# Patient Record
Sex: FEMALE | Race: WHITE | NOT HISPANIC OR LATINO | URBAN - METROPOLITAN AREA
[De-identification: names, ages, dates, MRNs, and addresses within clinical notes are randomized per-mention and may not be internally consistent; named-entity substitution may affect disease eponyms.]

---

## 2017-07-31 ENCOUNTER — APPOINTMENT (RX ONLY)
Dept: URBAN - METROPOLITAN AREA CLINIC 12 | Facility: CLINIC | Age: 67
Setting detail: DERMATOLOGY
End: 2017-07-31

## 2017-07-31 DIAGNOSIS — Z80.8 FAMILY HISTORY OF MALIGNANT NEOPLASM OF OTHER ORGANS OR SYSTEMS: ICD-10-CM

## 2017-07-31 DIAGNOSIS — L20.89 OTHER ATOPIC DERMATITIS: ICD-10-CM

## 2017-07-31 PROBLEM — L20.84 INTRINSIC (ALLERGIC) ECZEMA: Status: ACTIVE | Noted: 2017-07-31

## 2017-07-31 PROCEDURE — 99202 OFFICE O/P NEW SF 15 MIN: CPT

## 2017-07-31 PROCEDURE — ? IN-HOUSE DISPENSING PHARMACY

## 2017-07-31 PROCEDURE — ? TREATMENT REGIMEN

## 2017-07-31 PROCEDURE — ? COUNSELING

## 2017-07-31 ASSESSMENT — LOCATION SIMPLE DESCRIPTION DERM
LOCATION SIMPLE: RIGHT FOREARM
LOCATION SIMPLE: RIGHT EYEBROW
LOCATION SIMPLE: LEFT EYEBROW
LOCATION SIMPLE: RIGHT CHEEK
LOCATION SIMPLE: LEFT CHEEK
LOCATION SIMPLE: LEFT FOREARM

## 2017-07-31 ASSESSMENT — LOCATION ZONE DERM
LOCATION ZONE: ARM
LOCATION ZONE: FACE

## 2017-07-31 ASSESSMENT — LOCATION DETAILED DESCRIPTION DERM
LOCATION DETAILED: RIGHT PROXIMAL DORSAL FOREARM
LOCATION DETAILED: RIGHT SUPERIOR LATERAL BUCCAL CHEEK
LOCATION DETAILED: LEFT PROXIMAL DORSAL FOREARM
LOCATION DETAILED: LEFT SUPERIOR CENTRAL BUCCAL CHEEK
LOCATION DETAILED: LEFT CENTRAL EYEBROW
LOCATION DETAILED: RIGHT CENTRAL EYEBROW

## 2017-07-31 NOTE — PROCEDURE: TREATMENT REGIMEN
Plan: Use Clobetasol ointment bid on the arms for 2-3 weeks then stop using for 10-14 days before reusing \\nUse cloderm bid on the face for 2 weeks then stop using for 10 days before reusing \\nRecommended using Cerave cream daily as often as possible
Discontinue Regimen: Aveeno itch relief cream\\nVanicream
Samples Given: Cloderm
Detail Level: Simple

## 2017-07-31 NOTE — PROCEDURE: MIPS QUALITY
Quality 110: Preventive Care And Screening: Influenza Immunization: Influenza Immunization not Administered because Patient Refused.
Quality 111:Pneumonia Vaccination Status For Older Adults: Pneumococcal Vaccination Previously Received
Quality 130: Documentation Of Current Medications In The Medical Record: Current Medications Documented
Detail Level: Detailed
Quality 226: Preventive Care And Screening: Tobacco Use: Screening And Cessation Intervention: Patient screened for tobacco and is a smoker AND received Cessation Counseling

## 2017-07-31 NOTE — PROCEDURE: IN-HOUSE DISPENSING PHARMACY
Product 56 Price/Unit (In Dollars): 0
Product 77 Unit Type: mg
Name Of Product 9: Anti-Fungal Nail Solution 858299 - \\nCiclopirox 8% - Fluconazole 1% - Terbinafine 1%
Render Product Pricing In Note: Yes
Product 1 Amount/Unit (Numbers Only): 30
Name Of Product 18: Wart Gel - [id number] \\nCimetidine 5% - Ibuprofen 2% - Salicylic Acid 17%
Name Of Product 6: Acne Gel Combo -513411\\nBenzoyl Peroxide 5% - Clidamycin 1% - Niacinamide 4%
Product 9 Unit Type: ml
Product 15 Application Directions: Use bid for 2 weeks then stop using for 10-14 days before reusing
Product 11 Application Directions: Apply topically to  affected area's QD-BID
Product 18 Price/Unit (In Dollars): 35.00
Product 13 Amount/Unit (Numbers Only): 60
Product 2 Refills: 5
Product 4 Refills: 2
Detail Level: Zone
Product 32 Amount/Unit (Numbers Only): 15
Product 2 Amount/Unit (Numbers Only): 30
Product 2 Price/Unit (In Dollars): 40.00
Name Of Product 14: Clob Cream - 860199 \\nClobetasol Propionate 0.05% - Niacinamide 4%
Product 3 Amount/Unit (Numbers Only): 30
Product 6 Application Directions: Apply topically to affected area's QD - BID
Product 5 Price/Unit (In Dollars): 45.00
Name Of Product 15: Triam Dermatitis Cream - 290213 \\nNiacinamide 4% - Triamcinolone Acetonide 0.1%
Product 16 Amount/Unit (Numbers Only): 60
Name Of Product 8: Actinic Keratosis Gel - 570786\\nImiquimoid 5% - Levocetirizine Dihydrochloride 1% - \\nNiacinamide 2%
Product 18 Application Directions: Apply to warts then occlusion at bedtime
Name Of Product 1: Tret 0.025% Cream - 329398 - \\nNiacinamide 4% -Tretinoin 0.025%
Name Of Product 7: Acne Gel - 889294\\nAdapalene 0.3% - Benzoyl Peroxide 2.5% - Niacinamide 4%
Product 10 Price/Unit (In Dollars): 20.00
Name Of Product 17: Rosacea Cream -339643\\nIvermectin 1% - Metronidazole 1% - Niacinamide 4% - Potassium Azeloyl Diglycinate 5%
Product 17 Application Directions: Apply topically to face QD to BID
Product 8 Application Directions: Apply topically to affected area's as directed by physician
Name Of Product 3: Tret 0.1% Cream - 825998 \\n Niacinamide 4% -Tretinoin 0.1%
Product 2 Application Directions: Apply 1 pump (pea-sized amount) to entire face at bed time
Product 13 Unit Type: grams
Name Of Product 5: Acne Gel With Dapsone -878697\\nDapsone 8.5% - Niacinamide 2% - Spironolactone 5%
Product 14 Application Directions: Apply bid to affected areas for up to 3 weeks then stop using 10-14 days before reusing. \\nDo not use on face, armpit, or groin areas.
Name Of Product 2: Tret 0.05% Cream - 595466 \\n Niacinamide 4% -Tretinoin 0.05%
Name Of Product 4: Benji 0.1% Acne Gel - 098257 \\n Niacinamide 4% - Tazoratene 0.1%
Product 16 Application Directions: Apply topically to affected area's QD
Product 13 Units Dispensed: 1
Name Of Product 16: Fluocinolone Scalp & Body Oil - 510850 \\nFluocinolone Acetonide 0.01% In Emu and Olive Oil
Product 9 Application Directions: Apply to affected toenails QD
Product 10 Application Directions: Apply to wound BID X 1-2 weeks
Name Of Product 13: Clob Ointment - 344445 \\nClobetasol Propionate 0.05% - Niacinamide 4%
Name Of Product 12: Clob Solution - 649287 \\nClobetasol Propionate 0.05% - Niacinamide 4%
Product 4 Application Directions: Apply a pea-sized amount to entire face at bed time
Product 12 Application Directions: Apply to affected areas bid for up to 3 weeks then stop using 10-14 days before reusing.\\nDo not apply to face, armpit, or groin areas.
Name Of Product 10: Antibacterial Wound Ointment -743785 \\nAloe Vera 0.2% - Lidocaine 2% - Mupirocin 2%-Tranilast 1%
Product 13 Application Directions: Apply to affected areas bid for up to 3 weeks then stop using 10-14 days before reusing.\\nDo not apply to face, armpit, or groin areas.
Name Of Product 11: Desonide Dermatitis Cream - 652878 \\nDesonide 0.05% - Niacinamide 4%

## 2017-08-22 ENCOUNTER — APPOINTMENT (RX ONLY)
Dept: URBAN - METROPOLITAN AREA CLINIC 12 | Facility: CLINIC | Age: 67
Setting detail: DERMATOLOGY
End: 2017-08-22

## 2017-08-22 DIAGNOSIS — L20.89 OTHER ATOPIC DERMATITIS: ICD-10-CM

## 2017-08-22 PROBLEM — L20.84 INTRINSIC (ALLERGIC) ECZEMA: Status: ACTIVE | Noted: 2017-08-22

## 2017-08-22 PROCEDURE — ? COUNSELING

## 2017-08-22 PROCEDURE — 99212 OFFICE O/P EST SF 10 MIN: CPT

## 2017-08-22 PROCEDURE — ? TREATMENT REGIMEN

## 2017-08-22 PROCEDURE — ? PRESCRIPTION

## 2017-08-22 RX ORDER — PREDNISONE 10 MG/1
TABLET ORAL
Qty: 56 | Refills: 0 | Status: ERX | COMMUNITY
Start: 2017-08-22

## 2017-08-22 RX ADMIN — PREDNISONE 1: 10 TABLET ORAL at 00:00

## 2017-08-22 ASSESSMENT — LOCATION SIMPLE DESCRIPTION DERM
LOCATION SIMPLE: LEFT EYEBROW
LOCATION SIMPLE: RIGHT CHEEK
LOCATION SIMPLE: LEFT CHEEK
LOCATION SIMPLE: RIGHT FOREARM
LOCATION SIMPLE: RIGHT EYEBROW
LOCATION SIMPLE: LEFT FOREARM

## 2017-08-22 ASSESSMENT — LOCATION DETAILED DESCRIPTION DERM
LOCATION DETAILED: RIGHT SUPERIOR LATERAL BUCCAL CHEEK
LOCATION DETAILED: LEFT CENTRAL EYEBROW
LOCATION DETAILED: LEFT PROXIMAL DORSAL FOREARM
LOCATION DETAILED: RIGHT PROXIMAL DORSAL FOREARM
LOCATION DETAILED: RIGHT CENTRAL EYEBROW
LOCATION DETAILED: LEFT SUPERIOR CENTRAL BUCCAL CHEEK

## 2017-08-22 ASSESSMENT — LOCATION ZONE DERM
LOCATION ZONE: FACE
LOCATION ZONE: ARM

## 2017-08-22 NOTE — PROCEDURE: MIPS QUALITY
Quality 130: Documentation Of Current Medications In The Medical Record: Current Medications Documented
Quality 110: Preventive Care And Screening: Influenza Immunization: Influenza Immunization not Administered because Patient Refused.
Detail Level: Detailed
Quality 111:Pneumonia Vaccination Status For Older Adults: Pneumococcal Vaccination Previously Received
Quality 226: Preventive Care And Screening: Tobacco Use: Screening And Cessation Intervention: Patient screened for tobacco and is a smoker AND received Cessation Counseling

## 2017-08-22 NOTE — PROCEDURE: TREATMENT REGIMEN
Detail Level: Simple
Continue Regimen: Cerave cream daily as often as possible
Plan: Prednisone tapering dose 10mg take 4 tabs po qd for 7 days then decrease by half a tab qod, instructions given \\nTo Use once she flares up, apply Clobetasol ointment bid on the arms for 2-3 weeks then stop using for 10-14 days before reusing

## 2017-09-15 ENCOUNTER — APPOINTMENT (RX ONLY)
Dept: URBAN - METROPOLITAN AREA CLINIC 12 | Facility: CLINIC | Age: 67
Setting detail: DERMATOLOGY
End: 2017-09-15

## 2017-09-15 DIAGNOSIS — L20.89 OTHER ATOPIC DERMATITIS: ICD-10-CM

## 2017-09-15 PROBLEM — L30.9 DERMATITIS, UNSPECIFIED: Status: ACTIVE | Noted: 2017-09-15

## 2017-09-15 PROCEDURE — ? BIOPSY BY PUNCH METHOD

## 2017-09-15 PROCEDURE — ? TREATMENT REGIMEN

## 2017-09-15 PROCEDURE — ? COUNSELING

## 2017-09-15 PROCEDURE — 11100: CPT

## 2017-09-15 PROCEDURE — 11101: CPT

## 2017-09-15 PROCEDURE — ? PRESCRIPTION

## 2017-09-15 RX ORDER — DOXYCYCLINE HYCLATE 100 MG/1
TABLET, COATED ORAL
Qty: 60 | Refills: 0 | Status: ERX | COMMUNITY
Start: 2017-09-15

## 2017-09-15 RX ADMIN — DOXYCYCLINE HYCLATE: 100 TABLET, COATED ORAL at 15:06

## 2017-09-15 ASSESSMENT — LOCATION DETAILED DESCRIPTION DERM
LOCATION DETAILED: RIGHT CENTRAL EYEBROW
LOCATION DETAILED: LEFT SUPERIOR CENTRAL BUCCAL CHEEK
LOCATION DETAILED: RIGHT PROXIMAL DORSAL FOREARM
LOCATION DETAILED: RIGHT SUPERIOR LATERAL BUCCAL CHEEK
LOCATION DETAILED: LEFT CENTRAL EYEBROW
LOCATION DETAILED: LEFT PROXIMAL DORSAL FOREARM
LOCATION DETAILED: RIGHT DISTAL DORSAL FOREARM
LOCATION DETAILED: RIGHT DORSAL WRIST

## 2017-09-15 ASSESSMENT — LOCATION SIMPLE DESCRIPTION DERM
LOCATION SIMPLE: RIGHT EYEBROW
LOCATION SIMPLE: LEFT CHEEK
LOCATION SIMPLE: RIGHT WRIST
LOCATION SIMPLE: LEFT FOREARM
LOCATION SIMPLE: RIGHT CHEEK
LOCATION SIMPLE: LEFT EYEBROW
LOCATION SIMPLE: RIGHT FOREARM

## 2017-09-15 ASSESSMENT — LOCATION ZONE DERM
LOCATION ZONE: FACE
LOCATION ZONE: ARM

## 2017-09-15 NOTE — PROCEDURE: TREATMENT REGIMEN
Plan: Asked Lizzy to schedule an appointment with [facility] or [place] [facility]
Samples Given: Eucrisa and Cordran cream
Initiate Treatment: Doxycycline 100mg 1 tab po bid X1 month
Detail Level: Simple
Continue Regimen: Cerave cream daily as often as possible\\nClobetasol ointment bid on the arms for 2-3 weeks then stop using for 10-14 days before reusing
Notification: Please note that there are new Treatment Regimen plans which have an enhanced patient education handout experience.  The plans are called Treatment Regimen (Acne), Treatment Regimen (Atopic Dermatitis), Treatment Regimen (Rosacea), Treatment Regimen (Sun Protection), Treatment Regimen (Cosmetic Products), and Treatment Regimen (Xerosis).

## 2017-09-15 NOTE — PROCEDURE: MIPS QUALITY
Quality 130: Documentation Of Current Medications In The Medical Record: Current Medications Documented
Quality 111:Pneumonia Vaccination Status For Older Adults: Pneumococcal Vaccination Previously Received
Detail Level: Detailed
Quality 110: Preventive Care And Screening: Influenza Immunization: Influenza Immunization not Administered because Patient Refused.

## 2017-09-15 NOTE — PROCEDURE: BIOPSY BY PUNCH METHOD
Hemostasis: Electrocautery
Lab Facility: [id number]
Biopsy Type: H and E
Wound Care: Vaseline
Additional Anesthesia Volume In Cc (Will Not Render If 0): 0
Patient Will Remove Sutures At Home?: No
Punch Size In Mm: 4
Anesthesia Type: 2% lidocaine without epinephrine
Billing Type: Third-Party Bill
Suture Removal: 14 days
Epidermal Sutures: 4-0 Nylon
Home Suture Removal Text: Patient was provided a home suture removal kit and will remove their sutures at home.  If they have any questions or difficulties they will call the office.
Lab: [id number]
Anesthesia Volume In Cc (Will Not Render If 0): 2
Dressing: pressure dressing with telfa
Detail Level: Detailed
Lab Facility: [id number]
Lab: [id number]
Home Suture Removal Text: Patient was provided a home suture removal kit and will remove their sutures at home.  If they have any questions or difficulties they will call the office.
Billing Type: Third-Party Bill

## 2017-09-29 ENCOUNTER — APPOINTMENT (RX ONLY)
Dept: URBAN - METROPOLITAN AREA CLINIC 12 | Facility: CLINIC | Age: 67
Setting detail: DERMATOLOGY
End: 2017-09-29

## 2017-09-29 DIAGNOSIS — Z48.02 ENCOUNTER FOR REMOVAL OF SUTURES: ICD-10-CM

## 2017-09-29 PROCEDURE — ? SUTURE REMOVAL (GLOBAL PERIOD)

## 2017-09-29 ASSESSMENT — LOCATION ZONE DERM: LOCATION ZONE: ARM

## 2017-09-29 ASSESSMENT — LOCATION SIMPLE DESCRIPTION DERM
LOCATION SIMPLE: RIGHT WRIST
LOCATION SIMPLE: RIGHT FOREARM

## 2017-09-29 ASSESSMENT — LOCATION DETAILED DESCRIPTION DERM
LOCATION DETAILED: RIGHT DISTAL DORSAL FOREARM
LOCATION DETAILED: RIGHT DORSAL WRIST

## 2017-09-29 NOTE — PROCEDURE: SUTURE REMOVAL (GLOBAL PERIOD)
Detail Level: Detailed
Add 78054 Cpt? (Important Note: In 2017 The Use Of 77750 Is Being Tracked By Cms To Determine Future Global Period Reimbursement For Global Periods): no